# Patient Record
(demographics unavailable — no encounter records)

---

## 2024-11-18 NOTE — PHYSICAL EXAM
[Well-appearing] : well-appearing [Normocephalic] : normocephalic [No dysmorphic facial features] : no dysmorphic facial features [No abnormal neurocutaneous stigmata or skin lesions] : no abnormal neurocutaneous stigmata or skin lesions [Straight] : straight [No jolene or dimples] : no jolene or dimples [No deformities] : no deformities [Alert] : alert [Well related, good eye contact] : well related, good eye contact [Conversant] : conversant [Normal speech and language] : normal speech and language [Follows instructions well] : follows instructions well [No facial asymmetry or weakness] : no facial asymmetry or weakness [Gross hearing intact] : gross hearing intact [Normal axial and appendicular muscle tone] : normal axial and appendicular muscle tone [Gets up on table without difficulty] : gets up on table without difficulty [No abnormal involuntary movements] : no abnormal involuntary movements [Walks and runs well] : walks and runs well [Good walking balance] : good walking balance [Normal gait] : normal gait

## 2024-11-18 NOTE — PLAN
[FreeTextEntry1] : - Gustavo and aEEG - Adderall XR 5 mg - Discussed use of medication as well as possible side effects  - Follow up 1 month

## 2024-11-18 NOTE — CONSULT LETTER
[Dear  ___] : Dear  [unfilled], [Consult Letter:] : I had the pleasure of evaluating your patient, [unfilled]. [Please see my note below.] : Please see my note below. [Consult Closing:] : Thank you very much for allowing me to participate in the care of this patient.  If you have any questions, please do not hesitate to contact me. [Sincerely,] : Sincerely, [FreeTextEntry3] : Janay Hanna, JACOB-BC Board Certified Family Nurse Practitioner Pediatric Neurology [facility] [address] [place] [address] Tel: [phone] Fax: [phone]

## 2024-11-18 NOTE — HISTORY OF PRESENT ILLNESS
[FreeTextEntry1] : TIEN COULTER is a 5 year old male with ADHD here to establish care.   Educational assessment: Current Grade: K Current District: [employer] recently diagnosed with ADHD and ODD and started Ritalin about a month ago. He takes IR 5 mg at 8 am and 12 pm. Initially teacher noticed improvement, but then he was having a hard time again. They tried to go up to 10 mg, but mother found he was more hyperactive. He has been back on 5 mg.   Tien is currently in a self contained 8:1:1 classroom with an IEP and receives OT and PT. He has been having a lot of behavioral issues. He has eloped the building multiple times. He has also gotten very aggressive with his teacher and other kids. He has a very hard time sitting still and he is always moving around. Mother has been afraid to send him to school because of the risk of leaving the building. Academically he is very smart and capable, but he has a hard time as a result of his behavior. He has private tutoring because of the work he has missed.  Mother hired a patient advocate for school who told her to send him back to school. The school has been trying to get him to go to [employer]. Since Tien returned over the last week there has been improvement. At home mother reports a longstanding issue with behavior. She said since he was little she had a hard time taking him places because he always runs away. He is unable to sit still and he is very hyperactive. He does not follow instructions and does what he wants. He is very impulsive. He is able to follow multistep commands, but oftentimes he does not want to. He is unable to sit for a meal or a movie. When completing school work he requires constant one on one support and redirection. When sitting he will always have to get up to run then sit back down. He recently started Karate which mother says has already helped.   Socially there are no concerns.   Tien sleeps with mother. It is difficult to get him to wind down at night, but once he is in bed he will fall asleep. Denies any issues with maintaining sleep throughout the night. Denies any parasomnias or restlessness while asleep.   Mother says ever since he was little he would have staring episodes frequently that it takes a few moments to get his attention. Typically lasts about 30 seconds and happens multiple times per week. When playing he will stop, freeze, and then go back to what he was doing. No serious head injury, meningoencephalitis.   Hx: older sister ALEX

## 2024-11-18 NOTE — ASSESSMENT
[FreeTextEntry1] : CARTER is a 5 year old with ADHD on Ritalin here with mother to establish care. Currently in a self contained 8:1:1 classroom with an IEP and receives OT and PT. Non focal neuro exam. Will d/c ritalin and started Adderall ER. Will get rEEG and aEEG to rule out absence epilepsy.

## 2024-12-19 NOTE — HISTORY OF PRESENT ILLNESS
[FreeTextEntry1] : TIEN COULTER is a 5 year old male with ADHD on Adderall XR 5 mg here for a follow up.    Initial Evaluation:  Educational assessment: Current Grade: K Current District: [employer] recently diagnosed with ADHD and ODD and started Ritalin about a month ago. He takes IR 5 mg at 8 am and 12 pm. Initially teacher noticed improvement, but then he was having a hard time again. They tried to go up to 10 mg, but mother found he was more hyperactive. He has been back on 5 mg.   Tien is currently in a self contained 8:1:1 classroom with an IEP and receives OT and PT. He has been having a lot of behavioral issues. He has eloped the building multiple times. He has also gotten very aggressive with his teacher and other kids. He has a very hard time sitting still and he is always moving around. Mother has been afraid to send him to school because of the risk of leaving the building. Academically he is very smart and capable, but he has a hard time as a result of his behavior. He has private tutoring because of the work he has missed.  Mother hired a patient advocate for school who told her to send him back to school. The school has been trying to get him to go to [employer]. Since Tien returned over the last week there has been improvement. At home mother reports a longstanding issue with behavior. She said since he was little she had a hard time taking him places because he always runs away. He is unable to sit still and he is very hyperactive. He does not follow instructions and does what he wants. He is very impulsive. He is able to follow multistep commands, but oftentimes he does not want to. He is unable to sit for a meal or a movie. When completing school work he requires constant one on one support and redirection. When sitting he will always have to get up to run then sit back down. He recently started Karate which mother says has already helped.   Socially there are no concerns.   Tien sleeps with mother. It is difficult to get him to wind down at night, but once he is in bed he will fall asleep. Denies any issues with maintaining sleep throughout the night. Denies any parasomnias or restlessness while asleep.   Mother says ever since he was little he would have staring episodes frequently that it takes a few moments to get his attention. Typically lasts about 30 seconds and happens multiple times per week. When playing he will stop, freeze, and then go back to what he was doing. No serious head injury, meningoencephalitis.   Hx: older sister ALEX

## 2024-12-19 NOTE — CONSULT LETTER
[Dear  ___] : Dear  [unfilled], [Consult Letter:] : I had the pleasure of evaluating your patient, [unfilled]. [Please see my note below.] : Please see my note below. [Consult Closing:] : Thank you very much for allowing me to participate in the care of this patient.  If you have any questions, please do not hesitate to contact me. [Sincerely,] : Sincerely, [FreeTextEntry3] : Janay Hanna, JACOB-BC Board Certified Family Nurse Practitioner Pediatric Neurology [facility] [address] [place] [address] Tel: [phone] Fax: [phone]

## 2024-12-19 NOTE — REASON FOR VISIT
[Home] : at home, [unfilled] , at the time of the visit. [Medical Office: ([facility])___] : at the medical office located in  [FreeTextEntry2] : mother [Follow-Up Evaluation] : a follow-up evaluation for [ADHD] : ADHD [Mother] : mother [Medical Records] : medical records

## 2024-12-19 NOTE — PLAN
[FreeTextEntry1] :  - Adderall XR 5 mg - Discussed use of medication as well as possible side effects  - Follow up

## 2024-12-19 NOTE — ASSESSMENT
[FreeTextEntry1] : CARTER is a 5 year old with ADHD on Adderall here with mother to establish care. Currently in a self contained 8:1:1 classroom with an IEP and receives OT and PT. Normal eeg results discussed.  Reportedly doing well on current medication regimen. Denies any negative side effects. Will continue current dose of stimulant medication for ADHD.